# Patient Record
Sex: FEMALE | Race: WHITE | Employment: FULL TIME | URBAN - METROPOLITAN AREA
[De-identification: names, ages, dates, MRNs, and addresses within clinical notes are randomized per-mention and may not be internally consistent; named-entity substitution may affect disease eponyms.]

---

## 2018-05-06 ENCOUNTER — OFFICE VISIT (OUTPATIENT)
Dept: URGENT CARE | Facility: URGENT CARE | Age: 38
End: 2018-05-06
Payer: COMMERCIAL

## 2018-05-06 ENCOUNTER — NURSE TRIAGE (OUTPATIENT)
Dept: NURSING | Facility: CLINIC | Age: 38
End: 2018-05-06

## 2018-05-06 ENCOUNTER — HEALTH MAINTENANCE LETTER (OUTPATIENT)
Age: 38
End: 2018-05-06

## 2018-05-06 ENCOUNTER — RADIANT APPOINTMENT (OUTPATIENT)
Dept: GENERAL RADIOLOGY | Facility: CLINIC | Age: 38
End: 2018-05-06
Attending: PHYSICIAN ASSISTANT
Payer: COMMERCIAL

## 2018-05-06 VITALS — OXYGEN SATURATION: 100 % | TEMPERATURE: 98 F | HEART RATE: 74 BPM | WEIGHT: 150.1 LBS

## 2018-05-06 DIAGNOSIS — S69.91XA HAND INJURY, RIGHT, INITIAL ENCOUNTER: Primary | ICD-10-CM

## 2018-05-06 DIAGNOSIS — S69.91XA HAND INJURY, RIGHT, INITIAL ENCOUNTER: ICD-10-CM

## 2018-05-06 PROCEDURE — 99203 OFFICE O/P NEW LOW 30 MIN: CPT | Performed by: PHYSICIAN ASSISTANT

## 2018-05-06 PROCEDURE — 73130 X-RAY EXAM OF HAND: CPT | Mod: RT

## 2018-05-06 NOTE — TELEPHONE ENCOUNTER
"\"I fell on the tennis court yesterday and injured my hand and finger.  I think I dislocated my finger but then popped it back into place.  There is some significant swelling.  I am doing my best to keep it immobile, using ice, and Ibuprofen.\"    Disposition: ED advised for numbness.  Pt declined, states she will go to the [facility].      Reason for Disposition    [1] Numbness (loss of sensation) of finger(s) AND [2] present now    Additional Information    Negative: Serious injury with multiple fractures    Negative: [1] Major bleeding (e.g., actively dripping or spurting) AND [2] can't be stopped    Negative: Amputation    Negative: Sounds like a life-threatening emergency to the triager    Negative: Finger injury is main concern    Negative: Caused by a human bite    Negative: Wound looks infected    Negative: Caused by an animal bite    Negative: Bullet wound, stabbed by knife, or other serious penetrating wound    Negative: Looks like a broken bone (e.g., knuckle is gone or depressed)    Negative: Looks like a dislocated joint (e.g., crooked or deformed)    Negative: Cut over knuckle (MCP joint)    Negative: Skin is split open or gaping  (or length > 1/2 inch or 12 mm)    Negative: [1] Dirt in the wound AND [2] not removed with 15 minutes of scrubbing    Negative: [1] Bleeding AND [2] won't stop after 10 minutes of direct pressure (using correct technique)    Protocols used: HAND AND WRIST INJURY-ADULT-    "

## 2018-05-06 NOTE — MR AVS SNAPSHOT
"              After Visit Summary   2018    Livia Granger    MRN: [id number]           Patient Information     Date Of Birth          1980        Visit Information        Provider Department      2018 3:40 PM Melania Mcgraw PA-C [facility]        Today's Diagnoses     Hand injury, right, initial encounter    -  1       Follow-ups after your visit        Who to contact     If you have questions or need follow up information about today's clinic visit or your schedule please contact [facility] directly at [phone].  Normal or non-critical lab and imaging results will be communicated to you by [employer], letter or phone within 4 business days after the clinic has received the results. If you do not hear from us within 7 days, please contact the clinic through [employer] or phone. If you have a critical or abnormal lab result, we will notify you by phone as soon as possible.  Submit refill requests through [employer] or call your pharmacy and they will forward the refill request to us. Please allow 3 business days for your refill to be completed.          Additional Information About Your Visit        MyChart Information     [employer] lets you send messages to your doctor, view your test results, renew your prescriptions, schedule appointments and more. To sign up, go to [email or web address] . Click on \"Log in\" on the left side of the screen, which will take you to the Welcome page. Then click on \"Sign up Now\" on the right side of the page.     You will be asked to enter the access code listed below, as well as some personal information. Please follow the directions to create your username and password.     Your access code is: [email or web address]  Expires: 2018  4:33 PM     Your access code will  in 90 days. If you need help or a new code, please call your [place] clinic or [phone].        Care EveryWhere ID     This is your Care EveryWhere ID. This could be " used by other organizations to access your [place] medical records  [id number]        Your Vitals Were     Pulse Temperature Pulse Oximetry             74 98  F (36.7  C) (Tympanic) 100%          Blood Pressure from Last 3 Encounters:   No data found for BP    Weight from Last 3 Encounters:   05/06/18 150 lb 1.6 oz (68.1 kg)               Primary Care Provider Office Phone # Fax #    Maryann Malinbrittanie Cavazos [phone]       [facility] [address] [place] [address]        Equal Access to Services     [facility]: Hadii aad ku hadasho Soomaali, waaxda luqadaha, qaybta kaalmada adeegyada, waxay londonin hayeduardon adesherry macias . So [facility] [phone].    ATENCIÓN: Si habla español, tiene a cortez disposición servicios gratuitos de asistencia lingüística. [facility] [phone].    We comply with applicable federal civil rights laws and [place] laws. We do not discriminate on the basis of race, color, national origin, age, disability, sex, sexual orientation, or gender identity.            Thank you!     Thank you for choosing [facility]  for your care. Our goal is always to provide you with excellent care. Hearing back from our patients is one way we can continue to improve our services. Please take a few minutes to complete the written survey that you may receive in the mail after your visit with us. Thank you!             Your Updated Medication List - Protect others around you: Learn how to safely use, store and throw away your medicines at www.disposemymeds.org.      Notice  As of 5/6/2018 11:59 PM    You have not been prescribed any medications.

## 2018-05-30 NOTE — PROGRESS NOTES
SUBJECTIVE:  Chief Complaint   Patient presents with     Urgent Care     Hand Pain     pt was playing tennis and fall on hand yesterday      Livia Granger is a 37 year old female presents with a chief complaint of right hand pain, swelling, tenderness and early bruising.  .  The injury occurred 1 day(s) ago.   The injury happened while playing. How: playing tennis and feel onto hand immediate pain, delayed swelling, was able to bear weight directly after injury, no deformity was noted by the patient, bruising today.  The patient complained of mild pain  and has had decreased ROM.  Pain exacerbated by flexion/extension.  Relieved by rest.  She treated it initially with ice and Ibuprofen. This is the first time this type of injury has occurred to this patient.     No past medical history on file.  No current outpatient prescriptions on file.     Social History   Substance Use Topics     Smoking status: Not on file     Smokeless tobacco: Not on file     Alcohol use Not on file       ROS:  Review of systems negative except as stated above.    EXAM:   Pulse 74  Temp 98  F (36.7  C) (Tympanic)  Wt 150 lb 1.6 oz (68.1 kg)  SpO2 100%  Gen: healthy,alert,no distress  Extremity: hand has FROM of fingers and wrist.  Mild swelling generalized over hand and early bruising.  No deformity noted.   Tendon function intact  There is not compromise to the distal circulation.  Pulses are +2 and CRT is brisk  GENERAL APPEARANCE: healthy, alert and no distress  EXTREMITIES: peripheral pulses normal  SKIN: no suspicious lesions or rashes  NEURO: Normal strength and tone, sensory exam grossly normal, mentation intact and speech normal    X-RAY was done. - no fracture noted    assessment/plan:  (S69.91XA) Hand injury, right, initial encounter  (primary encounter diagnosis)  Comment:   Plan: XR Hand Right G/E 3 Views          No fracture noted.  Ice and OTC med for sx relief and activity as tolerated.  To FU with PCP as needed if sx  worsen.

## 2020-06-23 ENCOUNTER — OFFICE VISIT (OUTPATIENT)
Dept: URGENT CARE | Facility: URGENT CARE | Age: 40
End: 2020-06-23
Payer: COMMERCIAL

## 2020-06-23 VITALS
SYSTOLIC BLOOD PRESSURE: 118 MMHG | HEART RATE: 83 BPM | TEMPERATURE: 98 F | OXYGEN SATURATION: 97 % | DIASTOLIC BLOOD PRESSURE: 78 MMHG

## 2020-06-23 DIAGNOSIS — L23.7 CONTACT DERMATITIS DUE TO POISON IVY: Primary | ICD-10-CM

## 2020-06-23 PROCEDURE — 99213 OFFICE O/P EST LOW 20 MIN: CPT | Performed by: FAMILY MEDICINE

## 2020-06-23 RX ORDER — BECLOMETHASONE DIPROPIONATE HFA 80 UG/1
AEROSOL, METERED RESPIRATORY (INHALATION)
COMMUNITY
Start: 2020-03-18

## 2020-06-23 RX ORDER — ALBUTEROL SULFATE 90 UG/1
AEROSOL, METERED RESPIRATORY (INHALATION)
COMMUNITY
Start: 2019-07-02

## 2020-06-23 RX ORDER — PREDNISONE 20 MG/1
TABLET ORAL
Qty: 21 TABLET | Refills: 0 | Status: SHIPPED | OUTPATIENT
Start: 2020-06-23

## 2020-06-24 NOTE — PATIENT INSTRUCTIONS
Take 3 tablets of prednisone daily for 3 days, then    Take 2 tablets of prednisone daily for 3 days, then    Take 1 tablet of prednisone daily for 4 days, then    Take 1/2 tablet of prednisone daily for 4 days.    Then celebrate being done! :)

## 2020-12-06 ENCOUNTER — HEALTH MAINTENANCE LETTER (OUTPATIENT)
Age: 40
End: 2020-12-06

## 2021-05-31 ENCOUNTER — RECORDS - HEALTHEAST (OUTPATIENT)
Dept: ADMINISTRATIVE | Facility: CLINIC | Age: 41
End: 2021-05-31

## 2021-09-25 ENCOUNTER — HEALTH MAINTENANCE LETTER (OUTPATIENT)
Age: 41
End: 2021-09-25

## 2022-01-15 ENCOUNTER — HEALTH MAINTENANCE LETTER (OUTPATIENT)
Age: 42
End: 2022-01-15

## 2023-01-07 ENCOUNTER — HEALTH MAINTENANCE LETTER (OUTPATIENT)
Age: 43
End: 2023-01-07

## 2023-04-22 ENCOUNTER — HEALTH MAINTENANCE LETTER (OUTPATIENT)
Age: 43
End: 2023-04-22

## 2023-12-02 ENCOUNTER — HEALTH MAINTENANCE LETTER (OUTPATIENT)
Age: 43
End: 2023-12-02